# Patient Record
Sex: MALE | Race: WHITE | Employment: FULL TIME | URBAN - METROPOLITAN AREA
[De-identification: names, ages, dates, MRNs, and addresses within clinical notes are randomized per-mention and may not be internally consistent; named-entity substitution may affect disease eponyms.]

---

## 2019-06-12 ENCOUNTER — APPOINTMENT (OUTPATIENT)
Dept: CT IMAGING | Facility: HOSPITAL | Age: 24
End: 2019-06-12
Attending: EMERGENCY MEDICINE

## 2019-06-12 ENCOUNTER — HOSPITAL ENCOUNTER (EMERGENCY)
Facility: HOSPITAL | Age: 24
Discharge: HOME | End: 2019-06-12
Attending: EMERGENCY MEDICINE

## 2019-06-12 VITALS
WEIGHT: 220 LBS | RESPIRATION RATE: 18 BRPM | BODY MASS INDEX: 26.79 KG/M2 | OXYGEN SATURATION: 98 % | HEART RATE: 67 BPM | SYSTOLIC BLOOD PRESSURE: 130 MMHG | HEIGHT: 76 IN | DIASTOLIC BLOOD PRESSURE: 58 MMHG | TEMPERATURE: 98 F
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
## 2019-06-12 PROCEDURE — 82565 ASSAY OF CREATININE: CPT

## 2019-06-12 PROCEDURE — 71260 CT THORAX DX C+: CPT | Performed by: EMERGENCY MEDICINE

## 2019-06-12 PROCEDURE — 99284 EMERGENCY DEPT VISIT MOD MDM: CPT

## 2019-06-12 PROCEDURE — 74177 CT ABD & PELVIS W/CONTRAST: CPT | Performed by: EMERGENCY MEDICINE

## 2019-06-12 RX ORDER — HYDROCODONE BITARTRATE AND ACETAMINOPHEN 5; 325 MG/1; MG/1
1-2 TABLET ORAL EVERY 4 HOURS PRN
Qty: 10 TABLET | Refills: 0 | Status: SHIPPED | OUTPATIENT
Start: 2019-06-12 | End: 2019-06-19

## 2019-06-12 RX ORDER — IBUPROFEN 600 MG/1
600 TABLET ORAL ONCE
Status: COMPLETED | OUTPATIENT
Start: 2019-06-12 | End: 2019-06-12
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
## 2022-05-12 ENCOUNTER — HOSPITAL ENCOUNTER (OUTPATIENT)
Age: 27
Discharge: HOME | End: 2022-05-12
Payer: COMMERCIAL

## 2022-05-12 ENCOUNTER — APPOINTMENT (OUTPATIENT)
Dept: GENERAL RADIOLOGY | Age: 27
End: 2022-05-12
Attending: NURSE PRACTITIONER
Payer: COMMERCIAL

## 2022-05-12 VITALS
DIASTOLIC BLOOD PRESSURE: 66 MMHG | HEART RATE: 82 BPM | OXYGEN SATURATION: 100 % | SYSTOLIC BLOOD PRESSURE: 122 MMHG | TEMPERATURE: 99 F | RESPIRATION RATE: 18 BRPM

## 2022-05-12 DIAGNOSIS — S49.91XA INJURY OF RIGHT SHOULDER, INITIAL ENCOUNTER: Primary | ICD-10-CM

## 2022-05-12 DIAGNOSIS — T14.8XXA ABRASION: ICD-10-CM

## 2022-05-12 PROCEDURE — 73030 X-RAY EXAM OF SHOULDER: CPT | Performed by: NURSE PRACTITIONER

## 2022-05-12 RX ORDER — AMOXICILLIN AND CLAVULANATE POTASSIUM 875; 125 MG/1; MG/1
1 TABLET, FILM COATED ORAL 2 TIMES DAILY
Qty: 14 TABLET | Refills: 0 | Status: SHIPPED | OUTPATIENT
Start: 2022-05-12 | End: 2022-05-19
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
## 2025-08-15 ENCOUNTER — HOSPITAL ENCOUNTER (EMERGENCY)
Age: 30
Discharge: HOME | End: 2025-08-15

## 2025-08-15 ENCOUNTER — APPOINTMENT (OUTPATIENT)
Dept: GENERAL RADIOLOGY | Age: 30
End: 2025-08-15
Attending: NURSE PRACTITIONER

## 2025-08-15 VITALS
TEMPERATURE: 97.7 F | HEIGHT: 76 IN | OXYGEN SATURATION: 98 % | WEIGHT: 215.72 LBS | HEART RATE: 57 BPM | BODY MASS INDEX: 26.27 KG/M2 | SYSTOLIC BLOOD PRESSURE: 115 MMHG | RESPIRATION RATE: 18 BRPM | DIASTOLIC BLOOD PRESSURE: 69 MMHG

## 2025-08-15 DIAGNOSIS — S62.307A CLOSED DISPLACED FRACTURE OF FIFTH METACARPAL BONE OF LEFT HAND, UNSPECIFIED PORTION OF METACARPAL, INITIAL ENCOUNTER: Primary | ICD-10-CM

## 2025-08-15 PROCEDURE — 73130 X-RAY EXAM OF HAND: CPT

## 2025-08-15 PROCEDURE — 99284 EMERGENCY DEPT VISIT MOD MDM: CPT

## 2025-08-15 PROCEDURE — 29125 APPL SHORT ARM SPLINT STATIC: CPT

## 2025-08-15 RX ORDER — HYDROCODONE BITARTRATE AND ACETAMINOPHEN 5; 325 MG/1; MG/1
1 TABLET ORAL EVERY 6 HOURS PRN
Qty: 12 TABLET | Refills: 0 | Status: SHIPPED | OUTPATIENT
Start: 2025-08-15 | End: 2025-08-15 | Stop reason: ALTCHOICE

## 2025-08-15 RX ORDER — HYDROCODONE BITARTRATE AND ACETAMINOPHEN 5; 325 MG/1; MG/1
1 TABLET ORAL EVERY 4 HOURS PRN
Qty: 12 TABLET | Refills: 0 | Status: SHIPPED | OUTPATIENT
Start: 2025-08-15

## (undated) NOTE — ED AVS SNAPSHOT
Adalberto Gillespie   MRN: [id number]    Department:  [facility] Emergency Department   Date of Visit:  6/12/2019           Disclosure     Insurance plans vary and the physician(s) referred by the ER may not be covered by your plan.  Please contact your CARE PHYSICIAN AT ONCE OR RETURN IMMEDIATELY TO THE EMERGENCY DEPARTMENT. If you have been prescribed any medication(s), please fill your prescription right away and begin taking the medication(s) as directed.   If you believe that any of the medications